# Patient Record
Sex: MALE | Race: WHITE | NOT HISPANIC OR LATINO | URBAN - METROPOLITAN AREA
[De-identification: names, ages, dates, MRNs, and addresses within clinical notes are randomized per-mention and may not be internally consistent; named-entity substitution may affect disease eponyms.]

---

## 2024-03-28 ENCOUNTER — APPOINTMENT (RX ONLY)
Dept: URBAN - METROPOLITAN AREA CLINIC 163 | Facility: CLINIC | Age: 64
Setting detail: DERMATOLOGY
End: 2024-03-28

## 2024-03-28 DIAGNOSIS — L21.8 OTHER SEBORRHEIC DERMATITIS: ICD-10-CM | Status: INADEQUATELY CONTROLLED

## 2024-03-28 DIAGNOSIS — D22 MELANOCYTIC NEVI: ICD-10-CM | Status: WELL CONTROLLED

## 2024-03-28 DIAGNOSIS — L85.3 XEROSIS CUTIS: ICD-10-CM | Status: INADEQUATELY CONTROLLED

## 2024-03-28 DIAGNOSIS — L82.0 INFLAMED SEBORRHEIC KERATOSIS: ICD-10-CM | Status: INADEQUATELY CONTROLLED

## 2024-03-28 PROBLEM — D22.5 MELANOCYTIC NEVI OF TRUNK: Status: ACTIVE | Noted: 2024-03-28

## 2024-03-28 PROCEDURE — ? PRESCRIPTION

## 2024-03-28 PROCEDURE — ? COUNSELING

## 2024-03-28 PROCEDURE — 99203 OFFICE O/P NEW LOW 30 MIN: CPT | Mod: 25

## 2024-03-28 PROCEDURE — ? PRESCRIPTION MEDICATION MANAGEMENT

## 2024-03-28 PROCEDURE — 17110 DESTRUCTION B9 LES UP TO 14: CPT

## 2024-03-28 PROCEDURE — ? LIQUID NITROGEN

## 2024-03-28 PROCEDURE — ? OTC TREATMENT REGIMEN

## 2024-03-28 RX ORDER — KETOCONAZOLE 20 MG/G
CREAM TOPICAL
Qty: 60 | Refills: 5 | Status: ERX | COMMUNITY
Start: 2024-03-28

## 2024-03-28 RX ADMIN — KETOCONAZOLE: 20 CREAM TOPICAL at 00:00

## 2024-03-28 ASSESSMENT — LOCATION DETAILED DESCRIPTION DERM
LOCATION DETAILED: RIGHT MEDIAL MALAR CHEEK
LOCATION DETAILED: LEFT LATERAL MALAR CHEEK
LOCATION DETAILED: LEFT MEDIAL MALAR CHEEK
LOCATION DETAILED: RIGHT SUPERIOR LATERAL MIDBACK
LOCATION DETAILED: LEFT CENTRAL EYEBROW
LOCATION DETAILED: RIGHT MEDIAL UPPER BACK
LOCATION DETAILED: RIGHT PROXIMAL DORSAL FOREARM
LOCATION DETAILED: RIGHT CENTRAL EYEBROW
LOCATION DETAILED: LEFT INFERIOR FOREHEAD
LOCATION DETAILED: LEFT PROXIMAL DORSAL FOREARM

## 2024-03-28 ASSESSMENT — LOCATION SIMPLE DESCRIPTION DERM
LOCATION SIMPLE: RIGHT EYEBROW
LOCATION SIMPLE: RIGHT UPPER BACK
LOCATION SIMPLE: RIGHT LOWER BACK
LOCATION SIMPLE: LEFT FOREARM
LOCATION SIMPLE: LEFT EYEBROW
LOCATION SIMPLE: LEFT CHEEK
LOCATION SIMPLE: RIGHT CHEEK
LOCATION SIMPLE: LEFT FOREHEAD
LOCATION SIMPLE: RIGHT FOREARM

## 2024-03-28 ASSESSMENT — LOCATION ZONE DERM
LOCATION ZONE: ARM
LOCATION ZONE: TRUNK
LOCATION ZONE: FACE

## 2024-03-28 NOTE — PROCEDURE: LIQUID NITROGEN
Spray Paint Text: The liquid nitrogen was applied to the skin utilizing a spray paint frosting technique.
Render Post-Care Instructions In Note?: no
Duration Of Freeze Thaw-Cycle (Seconds): 5-10
Show Topical Anesthesia Variable?: Yes
Application Tool (Optional): Cry-AC
Aperture Size (Optional): B
Post-Care Instructions: I reviewed with the patient in detail post-care instructions. Patient is to wear sunprotection, and avoid picking at any of the treated lesions. Pt may apply Vaseline to crusted or scabbing areas.
Medical Necessity Clause: This procedure was medically necessary because the lesions that were treated were:
Number Of Freeze-Thaw Cycles: 2 freeze-thaw cycles
Consent: The patient's consent was obtained including but not limited to risks of crusting, scabbing, blistering, scarring, darker or lighter pigmentary change, recurrence, incomplete removal and infection.
Detail Level: Detailed
Medical Necessity Information: It is in your best interest to select a reason for this procedure from the list below. All of these items fulfill various CMS LCD requirements except the new and changing color options.

## 2024-03-28 NOTE — PROCEDURE: OTC TREATMENT REGIMEN
Samples Given: Aveeno creams, Dove bar soap
Patient Specific Otc Recommendations (Will Not Stick From Patient To Patient): Recommend cream moisturizers, aveeno sampled today, switch from Lever 2000 to Dove
Detail Level: Zone

## 2024-03-28 NOTE — PROCEDURE: COUNSELING
Detail Level: Detailed
Patient Specific Counseling (Will Not Stick From Patient To Patient): One pink, \"tag-like\" mole on the mid back noted as irritated at times, discussed shave removal, deferred for today
Moisturizer Recommendations: Light facial moisturizers can be used.  Be sure to avoid adding oils and ointments to the affected areas as this will encourage yeast growth.
Shampoo Recommendations: Recommend alternating three different dandruff shampoos: Ideally salicylic acid (Denorex, Tsal, Dermarest...), Tar containing (Tgel, store brand tar shampoo) and one that targets yeast such as ketoconazole, selenium sulfide, pyrithione zinc or tea tree oil
Topical Antifungal Recommendations: Ketoconazole cream is a useful option that can be used twice daily on affected areas.
Topical Steroid Recommendations: Use of low potency steroids can be helpful for flares but should ideally be limited to 1-2 weeks at a time.  This is especially important in the central face area.
Cleanser Recommendations: Recommend gentle oil removing cleansers.  Wash well, lathering the affected areas and rinsing completely to remove oil and yeast on the skin.  Cerave foaming facial, Cetaphil oil removing foam wash, Aveeno clear complexion foaming cleanser are all good options
Moisturizer Recommendations: Recommend fragrance free cream moisturizers regularly for dry skin areas, especially within 3 minutes of bath/shower to lock in moisture
Patient Specific Counseling (Will Not Stick From Patient To Patient): - dove soap\\n- cetaphil or cerave / cream based, apply immediately after showering
Detail Level: Zone
Cleanser Recommendations: Recommend fragrance free gentle/hydrating cleansers, minimize time in hot/soapy water, keep to 10-15min max.

## 2024-03-28 NOTE — PROCEDURE: PRESCRIPTION MEDICATION MANAGEMENT
Initiate Treatment: Ketoconazole2% cream BID to scaly areas on face
Render In Strict Bullet Format?: No
Detail Level: Zone

## 2025-05-13 ENCOUNTER — APPOINTMENT (OUTPATIENT)
Dept: URBAN - METROPOLITAN AREA CLINIC 163 | Facility: CLINIC | Age: 65
Setting detail: DERMATOLOGY
End: 2025-05-13

## 2025-05-13 DIAGNOSIS — L21.8 OTHER SEBORRHEIC DERMATITIS: ICD-10-CM | Status: INADEQUATELY CONTROLLED

## 2025-05-13 DIAGNOSIS — D69.2 OTHER NONTHROMBOCYTOPENIC PURPURA: ICD-10-CM

## 2025-05-13 DIAGNOSIS — L82.1 OTHER SEBORRHEIC KERATOSIS: ICD-10-CM

## 2025-05-13 DIAGNOSIS — L81.4 OTHER MELANIN HYPERPIGMENTATION: ICD-10-CM

## 2025-05-13 PROCEDURE — 99213 OFFICE O/P EST LOW 20 MIN: CPT

## 2025-05-13 PROCEDURE — ? ADDITIONAL NOTES

## 2025-05-13 PROCEDURE — ? PRESCRIPTION

## 2025-05-13 PROCEDURE — ? PRESCRIPTION MEDICATION MANAGEMENT

## 2025-05-13 PROCEDURE — ? COUNSELING

## 2025-05-13 RX ORDER — KETOCONAZOLE 20 MG/G
CREAM TOPICAL
Qty: 60 | Refills: 5 | Status: ERX

## 2025-05-13 RX ORDER — TRETIONIN 1 MG/G
CREAM TOPICAL
Qty: 45 | Refills: 3 | Status: ERX | COMMUNITY
Start: 2025-05-13

## 2025-05-13 RX ADMIN — TRETIONIN: 1 CREAM TOPICAL at 00:00

## 2025-05-13 ASSESSMENT — LOCATION DETAILED DESCRIPTION DERM
LOCATION DETAILED: RIGHT DISTAL DORSAL FOREARM
LOCATION DETAILED: LEFT DISTAL DORSAL FOREARM
LOCATION DETAILED: PERIUMBILICAL SKIN
LOCATION DETAILED: LEFT MEDIAL MALAR CHEEK
LOCATION DETAILED: LEFT SUPERIOR MEDIAL UPPER BACK
LOCATION DETAILED: EPIGASTRIC SKIN
LOCATION DETAILED: RIGHT MEDIAL MALAR CHEEK
LOCATION DETAILED: RIGHT CENTRAL EYEBROW
LOCATION DETAILED: LEFT CENTRAL EYEBROW

## 2025-05-13 ASSESSMENT — LOCATION SIMPLE DESCRIPTION DERM
LOCATION SIMPLE: LEFT CHEEK
LOCATION SIMPLE: LEFT UPPER BACK
LOCATION SIMPLE: RIGHT CHEEK
LOCATION SIMPLE: RIGHT FOREARM
LOCATION SIMPLE: LEFT FOREARM
LOCATION SIMPLE: RIGHT EYEBROW
LOCATION SIMPLE: LEFT EYEBROW
LOCATION SIMPLE: ABDOMEN

## 2025-05-13 ASSESSMENT — LOCATION ZONE DERM
LOCATION ZONE: TRUNK
LOCATION ZONE: FACE
LOCATION ZONE: ARM

## 2025-05-13 NOTE — PROCEDURE: COUNSELING
Sunscreen Recommendations: Daily spf 30+ sunscreen for the face, higher spf 50+ for more intense sun(longer than 30minutes in the sun)
Detail Level: Zone
Detail Level: Detailed
Moisturizer Recommendations: Light facial moisturizers can be used.  Be sure to avoid adding oils and ointments to the affected areas as this will encourage yeast growth.
Shampoo Recommendations: Recommend alternating three different dandruff shampoos: Ideally salicylic acid (Denorex, Tsal, Dermarest...), Tar containing (Tgel, store brand tar shampoo) and one that targets yeast such as ketoconazole, selenium sulfide, pyrithione zinc or tea tree oil
Topical Antifungal Recommendations: Ketoconazole cream is a useful option that can be used twice daily on affected areas.
Topical Steroid Recommendations: Use of low potency steroids can be helpful for flares but should ideally be limited to 1-2 weeks at a time.  This is especially important in the central face area.
Cleanser Recommendations: Recommend gentle oil removing cleansers.  Wash well, lathering the affected areas and rinsing completely to remove oil and yeast on the skin.  Cerave foaming facial, Cetaphil oil removing foam wash, Aveeno clear complexion foaming cleanser are all good options

## 2025-05-13 NOTE — PROCEDURE: ADDITIONAL NOTES
Additional Notes: Recommended OTC Dermend if unable to get tretinoin\\nOr get otc arnica gel and use a 1/2 fingertip unit of tretinoin and rub in together to strengthen skin over time and reduce bruising.\\nRecommend sleeves to prevent minor scrapes/abrasions and protect from sun
Detail Level: Simple
Render Risk Assessment In Note?: no